# Patient Record
Sex: FEMALE | Race: BLACK OR AFRICAN AMERICAN | NOT HISPANIC OR LATINO
[De-identification: names, ages, dates, MRNs, and addresses within clinical notes are randomized per-mention and may not be internally consistent; named-entity substitution may affect disease eponyms.]

---

## 2023-01-31 PROBLEM — Z00.00 ENCOUNTER FOR PREVENTIVE HEALTH EXAMINATION: Status: ACTIVE | Noted: 2023-01-31

## 2023-02-01 ENCOUNTER — APPOINTMENT (OUTPATIENT)
Dept: PULMONOLOGY | Facility: CLINIC | Age: 28
End: 2023-02-01
Payer: MEDICAID

## 2023-02-01 VITALS
WEIGHT: 210 LBS | BODY MASS INDEX: 37.21 KG/M2 | HEIGHT: 63 IN | SYSTOLIC BLOOD PRESSURE: 122 MMHG | OXYGEN SATURATION: 100 % | HEART RATE: 76 BPM | TEMPERATURE: 96.9 F | DIASTOLIC BLOOD PRESSURE: 71 MMHG

## 2023-02-01 DIAGNOSIS — I25.10 ATHEROSCLEROTIC HEART DISEASE OF NATIVE CORONARY ARTERY W/OUT ANGINA PECTORIS: ICD-10-CM

## 2023-02-01 PROCEDURE — 99204 OFFICE O/P NEW MOD 45 MIN: CPT

## 2023-02-01 NOTE — CONSULT LETTER
[Dear  ___] : Dear  [unfilled], [FreeTextEntry1] : I had the pleasure of evaluating your patient, Bee Laurent , in the office today.  Please review my consultation and evaluation report that follows below.  Please do not hesitate to call me if further information is necessary or if you wish to discuss ongoing care or diagnostic work-up.   \par I very much appreciate your referral and it is a privilege to be able to provide care for your patient.\par \par Sincerely,\par  \par Александр Galindo MD, MHCM, FACP, TYRELL-C\par Pulmonary Medicine\par  of Medicine\par [employer] [facility] [employer] at [facility]\par [email or web address]\par \par [facility] [place]\par [address]\[place]  [address]\par    Fax \par \par [facility] [place]\par [address]\par [place], [place] \par \par

## 2023-02-01 NOTE — ASSESSMENT
[FreeTextEntry1] : 28 yo woman is here for pulmonary clearance prior to revision of gastric sleeve performed\par in 2020 by Dr Washington\par \par Nonsmoker, no pulmonary disease., no pneumonia\par Vaccinated and boosted, no Covid disease\par No hx pneumonia\par No difficulty with surgery in the past\par + Snoring but no sleepiness or irregular breathing\par Did not have sleep test beffore original surgery which was uncomplicated\par Works overnight\par No children\par Takes no meds, \par Allergy to clindamycin hives noted\par Neg FH noted\par \par Imp 28 yo woman is preop for gastric sleeve revision\par No pulmonary history, no smoking\par Will obtain PFTs\par No indication for sleep study--good Malimpati score\par Final clearance after PFTs

## 2023-02-01 NOTE — HISTORY OF PRESENT ILLNESS
[TextBox_4] : 28 yo woman is here for pulmonary clearance prior to revision of gastric sleeve performed\par in 2020 by Dr Washington\par \par Nonsmoker, no pulmonary disease., no pneumonia\par Vaccinated and boosted, no Covid disease\par No hx pneumonia\par No difficulty with surgery in the past\par + Snoring but no sleepiness or irregular breathing\par Did not have sleep test beffore original surgery which was uncomplicated\par Works overnight\par No children\par Takes no meds, \par Allergy to clindamycin hives noted\par Neg FH noted

## 2023-03-27 ENCOUNTER — APPOINTMENT (OUTPATIENT)
Dept: PULMONOLOGY | Facility: CLINIC | Age: 28
End: 2023-03-27

## 2023-04-12 ENCOUNTER — APPOINTMENT (OUTPATIENT)
Dept: PULMONOLOGY | Facility: CLINIC | Age: 28
End: 2023-04-12
Payer: MEDICAID

## 2023-04-12 VITALS
HEART RATE: 77 BPM | HEIGHT: 63 IN | TEMPERATURE: 97.2 F | DIASTOLIC BLOOD PRESSURE: 76 MMHG | OXYGEN SATURATION: 98 % | BODY MASS INDEX: 36.68 KG/M2 | WEIGHT: 207 LBS | SYSTOLIC BLOOD PRESSURE: 123 MMHG

## 2023-04-12 DIAGNOSIS — E66.9 OBESITY, UNSPECIFIED: ICD-10-CM

## 2023-04-12 DIAGNOSIS — Z01.811 ENCOUNTER FOR PREPROCEDURAL RESPIRATORY EXAMINATION: ICD-10-CM

## 2023-04-12 PROCEDURE — 99214 OFFICE O/P EST MOD 30 MIN: CPT

## 2023-04-12 NOTE — HISTORY OF PRESENT ILLNESS
[TextBox_4] : 28 yo woman is here for pulmonary clearance prior to revision of gastric sleeve performed\par in 2020 by Dr Washington\par Nonsmoker, no pulmonary disease., no pneumonia\par Vaccinated and boosted, no Covid disease\par No hx pneumonia\par No difficulty with surgery in the past\par + Snoring but no sleepiness or irregular breathing\par Did not have sleep test beffore original surgery which was uncomplicated\par Works overnight\par No children\par Takes no meds, \par Allergy to clindamycin hives noted\par Neg FH noted\par

## 2023-04-12 NOTE — ASSESSMENT
[FreeTextEntry1] : 28 yo woman is here for pulmonary clearance prior to revision of gastric sleeve performed\par in 2020 by Dr Washington\par Nonsmoker, no pulmonary disease., no pneumonia\par Vaccinated and boosted, no Covid disease\par No hx pneumonia\par No difficulty with surgery in the past\par + Snoring but no sleepiness or irregular breathing\par Did not have sleep test beffore original surgery which was uncomplicated\par Works overnight\par No children\par Takes no meds, \par Allergy to clindamycin hives noted\par Neg FH noted\par \par \par \par \par \par \par Imp\par 28 yo woman who is here for pulmonary clearance prior to revision of gastric sleeve\par She has no pulmonary history\par PFTs are WNL\par There is no pulmonary contraindication to planned surgery at this time

## 2023-04-12 NOTE — CONSULT LETTER
[Dear  ___] : Dear  [unfilled], [FreeTextEntry1] : I had the pleasure of evaluating your patient, JUNE TALBOT , in the office today.  Please review my consultation and evaluation report that follows below.  Please do not hesitate to call me if further information is necessary or if you wish to discuss ongoing care or diagnostic work-up.   \par I very much appreciate your referral and it is a privilege to be able to provide care for your patient.\par \par Sincerely,\par  \par Александр Galindo MD, MHCM, FACP, TYRELL-C\par Pulmonary Medicine\par  of Medicine\par [employer] [facility] [employer] [facility] at [facility]\par [email or web address]\par \par [facility] in [place]\par [address]\[place]  [address]\par    Fax \par \par [facility] [place]\par [address]\par [place], [place] \par \par

## 2023-05-19 ENCOUNTER — APPOINTMENT (OUTPATIENT)
Dept: PULMONOLOGY | Facility: CLINIC | Age: 28
End: 2023-05-19